# Patient Record
Sex: MALE | Race: BLACK OR AFRICAN AMERICAN | NOT HISPANIC OR LATINO | Employment: FULL TIME | URBAN - METROPOLITAN AREA
[De-identification: names, ages, dates, MRNs, and addresses within clinical notes are randomized per-mention and may not be internally consistent; named-entity substitution may affect disease eponyms.]

---

## 2023-10-12 ENCOUNTER — OFFICE VISIT (OUTPATIENT)
Dept: ORTHOPEDIC SURGERY | Facility: HOSPITAL | Age: 68
End: 2023-10-12
Payer: COMMERCIAL

## 2023-10-12 DIAGNOSIS — S46.911A STRAIN OF RIGHT UPPER ARM, INITIAL ENCOUNTER: Primary | ICD-10-CM

## 2023-10-12 DIAGNOSIS — S56.211A STRAIN OF OTHER FLEXOR MUSCLE, FASCIA AND TENDON AT FOREARM LEVEL, RIGHT ARM, INITIAL ENCOUNTER: ICD-10-CM

## 2023-10-12 PROCEDURE — 99204 OFFICE O/P NEW MOD 45 MIN: CPT | Performed by: ORTHOPAEDIC SURGERY

## 2023-10-12 RX ORDER — ACETAMINOPHEN 500 MG
500 TABLET ORAL EVERY 8 HOURS PRN
COMMUNITY
Start: 2023-01-04

## 2023-10-12 ASSESSMENT — PAIN SCALES - GENERAL: PAINLEVEL_OUTOF10: 8

## 2023-10-12 ASSESSMENT — ENCOUNTER SYMPTOMS
CHILLS: 0
SHORTNESS OF BREATH: 0
ARTHRALGIAS: 1
WHEEZING: 0
FATIGUE: 0
FEVER: 0

## 2023-10-12 ASSESSMENT — PAIN - FUNCTIONAL ASSESSMENT: PAIN_FUNCTIONAL_ASSESSMENT: 0-10

## 2023-10-12 NOTE — PROGRESS NOTES
Reason for Appointment  Chief Complaint   Patient presents with    Right Shoulder - Pain     History of Present Illness  [facility] patient is a 68 y.o. male here today for evaluation of right shoulder pain. He fell forward and tripped 2/23/23 while working as a . He landed on his right elbow and francisco his shoulder on the ground. He did not have any right elbow pain prior to the fall. 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 He had therapy after this surgery with minimal relief. Prior to his fall, he did have some right shoulder pain that has increased since the fall.     Most recent MRI 4/20/23 shows a full-thickness tear of the supraspinatus and partial thickness tearing of the infraspinatus and subscapularis; no significant atrophy of the muscle. Previous MRI 8/2019 showed a high-grade tear of the supraspinatus. These MRI show evidence that his shoulder injury from 2019 has progressed to a complete tear. He has been off work since May as the pain has increased. He complains of anterior lateral shoulder pain that is worse with over head lifting. He has some trapezius pain but no numbness. His elbow is doing fairly well and responded to therapy.     History reviewed. No pertinent past medical history.    History reviewed. No pertinent surgical history.    Medication Documentation Review Audit       Reviewed by Vi Vance MA (Medical Assistant) on 10/12/23 at 0909      Medication Order Taking? Sig Documenting Provider Last Dose Status   acetaminophen (Tylenol) 500 mg tablet [id number] Yes TAKE 1 TABLET BY MOUTH EVERY 6 HOURS FOR UP TO 5 DAYS AS NEEDED FOR PAIN Historical Provider, MD  Active                    No Known Allergies    Review of Systems   Constitutional:  Negative for chills, fatigue and fever.   Respiratory:  Negative for shortness of breath and wheezing.    Cardiovascular:  Negative for chest pain and leg swelling.    Musculoskeletal:  Positive for arthralgias.   All other systems reviewed and are negative.      Exam   On exam, there is some cervical stiffness, no significant cervical pain, there may be more scapular winging in the right than the left, good ROM of the left arm and shoulder with good rotator cuff strength and deltoid function. On exam, the right arm lacks about 30 degrees of forward flexion, good deltoid function, positive impingement sign, weakness in external rotation with pain, mild biceps tenderness, no significant joint line or AC tenderness, good elbow and wrist ROM today, good motor strength, good wrist and elbow flexion and extension strength, there are some arthritic changes at the base of the thumb and distal joints, good motion, good pulses, good sensation, no skin lesions, no hyperreflexia, good pulses, good sensation, negative Kraig's sign    Assessment   Encounter Diagnoses   Name Primary?    Strain of right upper arm, initial encounter Yes    Strain of other flexor muscle, fascia and tendon at forearm level, right arm, initial encounter        Plan   Clearly, to a reasonable degree of medical probability, he had a strain injury to the shoulder and now has a complete supraspinatus rotator cuff tear. He did have mild symptoms and a previous injury, but this recent injury was enough to complete his rotator cuff tear. He is now almost 8 months out from injury and a C9 for the supraspinatus rotator cuff tear diagnosis and a C9 for arthroscopic rotator cuff repair will be placed. Patient understands the risks, benefits, and alternatives for this procedure fully.     I, Cora Jaramillo, attest that this documentation has been prepared under the direction and in the presence of Cesar Kramer MD. By signing below, I, Cesar Kramer MD, personally performed the services described in this documentation. All medical record entries made by the scribe were at my direction and in my presence. I have reviewed  the chart and agree that the record reflects my personal performance and is accurate and complete. 10/12/23

## 2023-11-16 ENCOUNTER — OFFICE VISIT (OUTPATIENT)
Dept: ORTHOPEDIC SURGERY | Facility: HOSPITAL | Age: 68
End: 2023-11-16
Payer: COMMERCIAL

## 2023-11-16 DIAGNOSIS — S46.911A STRAIN OF RIGHT UPPER ARM, INITIAL ENCOUNTER: Primary | ICD-10-CM

## 2023-11-16 DIAGNOSIS — S56.211A STRAIN OF OTHER FLEXOR MUSCLE, FASCIA AND TENDON AT FOREARM LEVEL, RIGHT ARM, INITIAL ENCOUNTER: ICD-10-CM

## 2023-11-16 PROCEDURE — 99213 OFFICE O/P EST LOW 20 MIN: CPT | Performed by: ORTHOPAEDIC SURGERY

## 2023-11-16 ASSESSMENT — ENCOUNTER SYMPTOMS
ARTHRALGIAS: 1
FATIGUE: 0
FEVER: 0
WHEEZING: 0
SHORTNESS OF BREATH: 0
CHILLS: 0

## 2023-11-16 ASSESSMENT — PAIN - FUNCTIONAL ASSESSMENT: PAIN_FUNCTIONAL_ASSESSMENT: 0-10

## 2023-11-16 ASSESSMENT — PAIN SCALES - GENERAL: PAINLEVEL_OUTOF10: 8

## 2023-11-16 NOTE — PROGRESS NOTES
Reason for Appointment  Chief Complaint   Patient presents with    Right Shoulder - Pain, Follow-up     [facility]     History of Present Illness  Patient is a 68 y.o. male here today for follow-up evaluation of right shoulder pain. To be clear, he did have a 2019 claim for his right shoulder, but did not have surgery for this injury. Previous MRI again did document a partial thickness tear of the rotator cuff, but after that last injury he was functioning well. He was doing well prior to February when he tripped and landed on the right shoulder. Clearly, the new MRI shows a full-thickness rotator cuff tear. To a reasonable degree of probability, this fall in February caused a complete tear of the rotator cuff. It is important to note that he did not have significant symptoms prior to this new injury. No other updates to PMH, allergies, or medications.     No past medical history on file.    No past surgical history on file.    Medication Documentation Review Audit       Reviewed by Vi Vance MA (Medical Assistant) on 10/12/23 at 0909      Medication Order Taking? Sig Documenting Provider Last Dose Status   acetaminophen (Tylenol) 500 mg tablet [id number] Yes TAKE 1 TABLET BY MOUTH EVERY 6 HOURS FOR UP TO 5 DAYS AS NEEDED FOR PAIN Historical Provider, MD  Active                    No Known Allergies    Review of Systems   Constitutional:  Negative for chills, fatigue and fever.   Respiratory:  Negative for shortness of breath and wheezing.    Cardiovascular:  Negative for chest pain and leg swelling.   Musculoskeletal:  Positive for arthralgias.     Exam   On exam of the right arm, he has pain with forward elevation, mild weakness in external rotation, markedly positive impingement sign, mild biceps tenderness, no significant AC joint tenderness, good biceps function, good pulses, good sensation, mild arthritic change and instability in the right thumb    Assessment   Encounter Diagnoses   Name Primary?    Strain  of right upper arm, initial encounter Yes    Strain of other flexor muscle, fascia and tendon at forearm level, right arm, initial encounter        Plan   We could not be any more clear about the need for surgical intervention due to the full-thickness rotator cuff tear    I, Cora Jaramillo, attest that this documentation has been prepared under the direction and in the presence of Cesar Kramer MD. By signing below, I, Cesar Kramer MD, personally performed the services described in this documentation. All medical record entries made by the scribe were at my direction and in my presence. I have reviewed the chart and agree that the record reflects my personal performance and is accurate and complete. 11/16/23

## 2024-01-11 ENCOUNTER — APPOINTMENT (OUTPATIENT)
Dept: ORTHOPEDIC SURGERY | Facility: HOSPITAL | Age: 69
End: 2024-01-11
Payer: COMMERCIAL

## 2024-01-18 ENCOUNTER — OFFICE VISIT (OUTPATIENT)
Dept: ORTHOPEDIC SURGERY | Facility: HOSPITAL | Age: 69
End: 2024-01-18
Payer: COMMERCIAL

## 2024-01-18 ENCOUNTER — HOSPITAL ENCOUNTER (OUTPATIENT)
Dept: RADIOLOGY | Facility: HOSPITAL | Age: 69
Discharge: HOME | End: 2024-01-18
Payer: COMMERCIAL

## 2024-01-18 DIAGNOSIS — S56.211A STRAIN OF OTHER FLEXOR MUSCLE, FASCIA AND TENDON AT FOREARM LEVEL, RIGHT ARM, INITIAL ENCOUNTER: ICD-10-CM

## 2024-01-18 DIAGNOSIS — S46.911A STRAIN OF RIGHT UPPER ARM, INITIAL ENCOUNTER: Primary | ICD-10-CM

## 2024-01-18 DIAGNOSIS — S46.911A STRAIN OF RIGHT UPPER ARM, INITIAL ENCOUNTER: ICD-10-CM

## 2024-01-18 PROCEDURE — 73030 X-RAY EXAM OF SHOULDER: CPT | Mod: RT

## 2024-01-18 PROCEDURE — 99213 OFFICE O/P EST LOW 20 MIN: CPT | Performed by: ORTHOPAEDIC SURGERY

## 2024-01-18 ASSESSMENT — ENCOUNTER SYMPTOMS
CHILLS: 0
WHEEZING: 0
FATIGUE: 0
FEVER: 0
SHORTNESS OF BREATH: 0
ARTHRALGIAS: 1

## 2024-01-18 ASSESSMENT — PAIN SCALES - GENERAL: PAINLEVEL_OUTOF10: 7

## 2024-01-18 ASSESSMENT — PAIN - FUNCTIONAL ASSESSMENT: PAIN_FUNCTIONAL_ASSESSMENT: 0-10

## 2024-01-18 NOTE — PROGRESS NOTES
Reason for Appointment  Chief Complaint   Patient presents with    Right Shoulder - Follow-up     [facility]     History of Present Illness  [facility] patient is a 68 y.o. male here today for follow-up evaluation of his right shoulder. We are still awaiting approval for the infraspinatus tear. Today, he complains of shoulder pain that comes and goes. Pain is located over the lateral shoulder and worse with overhead lifting. Pain keeps him up at night. X-rays today show a high-riding humeral head with some mild early joint DJD. No other updates to PMH, allergies, or medications.     History reviewed. No pertinent past medical history.    History reviewed. No pertinent surgical history.    Medication Documentation Review Audit       Reviewed by Vi Vance MA (Medical Assistant) on 01/18/24 at 0902      Medication Order Taking? Sig Documenting Provider Last Dose Status   acetaminophen (Tylenol) 500 mg tablet [id number] Yes TAKE 1 TABLET BY MOUTH EVERY 6 HOURS FOR UP TO 5 DAYS AS NEEDED FOR PAIN Historical Provider, MD Taking Active                    No Known Allergies    Review of Systems   Constitutional:  Negative for chills, fatigue and fever.   Respiratory:  Negative for shortness of breath and wheezing.    Cardiovascular:  Negative for chest pain and leg swelling.   Musculoskeletal:  Positive for arthralgias.   All other systems reviewed and are negative.    Exam   On exam of the left arm, it is difficult for him to raise the arm above 100 degrees, good deltoid function, weakness in external and internal rotation, crepitation in the shoulder, tender over the joint line, good pulses, good sensation, arthritic changes in the hand    Assessment   Encounter Diagnoses   Name Primary?    Strain of right upper arm, initial encounter Yes    Strain of other flexor muscle, fascia and tendon at forearm level, right arm, initial encounter        Plan   I will order an X-ray today to document arthritic change in the shoulder. He  can continue with activity as tolerated.     I, Cora Jaramillo, attest that this documentation has been prepared under the direction and in the presence of Cesar Kramer MD. By signing below, I, Cesar Kramer MD, personally performed the services described in this documentation. All medical record entries made by the scribe were at my direction and in my presence. I have reviewed the chart and agree that the record reflects my personal performance and is accurate and complete. 01/18/24

## 2024-03-14 ENCOUNTER — APPOINTMENT (OUTPATIENT)
Dept: ORTHOPEDIC SURGERY | Facility: HOSPITAL | Age: 69
End: 2024-03-14
Payer: COMMERCIAL

## 2024-04-09 ENCOUNTER — APPOINTMENT (OUTPATIENT)
Dept: ORTHOPEDIC SURGERY | Facility: CLINIC | Age: 69
End: 2024-04-09
Payer: COMMERCIAL

## 2024-04-16 ENCOUNTER — OFFICE VISIT (OUTPATIENT)
Dept: ORTHOPEDIC SURGERY | Facility: CLINIC | Age: 69
End: 2024-04-16
Payer: COMMERCIAL

## 2024-04-16 DIAGNOSIS — S46.011A STRAIN OF TENDON OF RIGHT ROTATOR CUFF, INITIAL ENCOUNTER: Primary | ICD-10-CM

## 2024-04-16 PROCEDURE — 99213 OFFICE O/P EST LOW 20 MIN: CPT | Performed by: ORTHOPAEDIC SURGERY

## 2024-04-16 ASSESSMENT — PAIN - FUNCTIONAL ASSESSMENT: PAIN_FUNCTIONAL_ASSESSMENT: 0-10

## 2024-04-16 ASSESSMENT — PAIN SCALES - GENERAL: PAINLEVEL_OUTOF10: 7

## 2024-04-17 ASSESSMENT — ENCOUNTER SYMPTOMS
SHORTNESS OF BREATH: 0
JOINT SWELLING: 0
FATIGUE: 0
FEVER: 0
TROUBLE SWALLOWING: 0
WHEEZING: 0
CHILLS: 0
COLOR CHANGE: 0

## 2024-04-17 NOTE — PROGRESS NOTES
Reason for Appointment  Continued R shoulder pain    History of Present Illness  Patient is a 68 y.o. male here today for a [facility] case follow-up evaluation of continued right shoulder pain.  We are still waiting for approval for additional allowances to be added to his claim.  Again, we cannot be any more clear about his reinjury, did have rotator cuff tears previous work injury in 2019 but the shoulder functioned well.  Most recent injury did show progression in terms of the rotator cuff tears and he has had a clear and show aggravation of these underlying conditions.  He continues to have pain and weakness with any overhead motion and activity.  He has not been able to work due to the pain in the shoulder, he is still in therapy but is not progressing.  No other changes in his past medical history, allergies, or medications.    History reviewed. No pertinent past medical history.    History reviewed. No pertinent surgical history.    Medication Documentation Review Audit       Reviewed by Vi Vance MA (Medical Assistant) on 04/16/24 at 1345      Medication Order Taking? Sig Documenting Provider Last Dose Status   acetaminophen (Tylenol) 500 mg tablet [id number] Yes TAKE 1 TABLET BY MOUTH EVERY 6 HOURS FOR UP TO 5 DAYS AS NEEDED FOR PAIN Historical Provider, MD Taking Active                  No Known Allergies    Review of Systems   Constitutional:  Negative for chills, fatigue and fever.   HENT:  Negative for mouth sores and trouble swallowing.    Respiratory:  Negative for shortness of breath and wheezing.    Cardiovascular:  Negative for chest pain and leg swelling.   Musculoskeletal:  Negative for joint swelling.   Skin:  Negative for color change and pallor.       Exam   On exam the right shoulder shows pain with active forward flexion up to 100 degrees.  He has some mild weakness with resisted external rotation but positive impingement signs on the right.  Deltoid is functional.  Tender anterior over  the joint line.  Good pulses and station in the upper extremity.    Assessment   Right shoulder partial rotator cuff tear    Plan   We are still awaiting approval for additional allowances to be added to his claim.  Again, we cannot be any more clear about the substantial aggravation of his underlying condition.  He can continue to work in therapy on gentle stretching and maintaining motion.  We will follow-up with him in 2 months.

## 2024-05-02 ENCOUNTER — OFFICE VISIT (OUTPATIENT)
Dept: ORTHOPEDIC SURGERY | Facility: HOSPITAL | Age: 69
End: 2024-05-02
Payer: COMMERCIAL

## 2024-05-02 DIAGNOSIS — S46.011A STRAIN OF TENDON OF RIGHT ROTATOR CUFF, INITIAL ENCOUNTER: Primary | ICD-10-CM

## 2024-05-02 PROCEDURE — L3670 SO ACRO/CLAV CAN WEB PRE OTS: HCPCS | Performed by: ORTHOPAEDIC SURGERY

## 2024-05-02 PROCEDURE — 99213 OFFICE O/P EST LOW 20 MIN: CPT | Performed by: ORTHOPAEDIC SURGERY

## 2024-05-02 RX ORDER — SODIUM CHLORIDE, SODIUM LACTATE, POTASSIUM CHLORIDE, CALCIUM CHLORIDE 600; 310; 30; 20 MG/100ML; MG/100ML; MG/100ML; MG/100ML
100 INJECTION, SOLUTION INTRAVENOUS CONTINUOUS
OUTPATIENT
Start: 2024-05-02

## 2024-05-02 RX ORDER — CEFAZOLIN SODIUM 2 G/100ML
2 INJECTION, SOLUTION INTRAVENOUS ONCE
OUTPATIENT
Start: 2024-05-02 | End: 2024-05-02

## 2024-05-02 ASSESSMENT — ENCOUNTER SYMPTOMS
WHEEZING: 0
FEVER: 0
SHORTNESS OF BREATH: 0
JOINT SWELLING: 0
TROUBLE SWALLOWING: 0
COLOR CHANGE: 0
FATIGUE: 0
CHILLS: 0

## 2024-05-02 ASSESSMENT — PAIN - FUNCTIONAL ASSESSMENT: PAIN_FUNCTIONAL_ASSESSMENT: 0-10

## 2024-05-02 ASSESSMENT — PAIN SCALES - GENERAL: PAINLEVEL_OUTOF10: 6

## 2024-05-02 NOTE — PROGRESS NOTES
Reason for Appointment  Chief Complaint   Patient presents with    Right Shoulder - Follow-up     Approved surgery     History of Present Illness  Patient is a 68 y.o. male here today for a [facility] case follow-up evaluation of continued right shoulder pain.  We have received approval for additional allowances and for surgery.  He continues to have pain and weakness with any overhead activities and lifting.  He is here to discuss surgery today.  No other changes in his past medical history, allergies, or medications.     History reviewed. No pertinent past medical history.    History reviewed. No pertinent surgical history.    Medication Documentation Review Audit       Reviewed by Vi Vance MA (Medical Assistant) on 05/02/24 at 6979      Medication Order Taking? Sig Documenting Provider Last Dose Status   acetaminophen (Tylenol) 500 mg tablet [id number]  TAKE 1 TABLET BY MOUTH EVERY 6 HOURS FOR UP TO 5 DAYS AS NEEDED FOR PAIN Historical Provider, MD  Active                    No Known Allergies    Review of Systems   Constitutional:  Negative for chills, fatigue and fever.   HENT:  Negative for postnasal drip and trouble swallowing.    Respiratory:  Negative for shortness of breath and wheezing.    Cardiovascular:  Negative for chest pain and leg swelling.   Musculoskeletal:  Negative for joint swelling.   Skin:  Negative for color change and pallor.     Exam   On exam he has about 130 degrees of active forward flexion, he has weakness with resisted external rotation but a functional deltoid.  Positive impingement signs on the right. Tenderness anteriorly over the biceps tendon sheath. Good pulses and sensation in the upper extremity    Assessment   Right rotator cuff tear    Plan   We answered all of his preoperative questions today.  He understands the risks of surge including nerve, artery, tendon damage, infection, continued pain, need for future surgery, and with his longstanding rotator cuff tear, he is at  higher risk for failure long-term, even needing a possible superior capsular graft and possibly needing a reverse shoulder replacement he will call and schedule a right shoulder arthroscopic rotator cuff repair.    Written by Sandy Kramer saw, evaluated, and treated the patient with the PA

## 2024-05-06 PROBLEM — S46.011A STRAIN OF TENDON OF RIGHT ROTATOR CUFF: Status: ACTIVE | Noted: 2024-05-02

## 2024-05-07 ENCOUNTER — PREP FOR PROCEDURE (OUTPATIENT)
Dept: ORTHOPEDIC SURGERY | Facility: CLINIC | Age: 69
End: 2024-05-07
Payer: COMMERCIAL

## 2024-05-07 DIAGNOSIS — S46.011A STRAIN OF TENDON OF RIGHT ROTATOR CUFF, INITIAL ENCOUNTER: Primary | ICD-10-CM

## 2024-05-21 ENCOUNTER — PRE-ADMISSION TESTING (OUTPATIENT)
Dept: PREADMISSION TESTING | Facility: HOSPITAL | Age: 69
End: 2024-05-21
Payer: COMMERCIAL

## 2024-05-21 VITALS
BODY MASS INDEX: 27.35 KG/M2 | HEART RATE: 54 BPM | RESPIRATION RATE: 16 BRPM | HEIGHT: 68 IN | SYSTOLIC BLOOD PRESSURE: 99 MMHG | TEMPERATURE: 97.3 F | WEIGHT: 180.45 LBS | OXYGEN SATURATION: 100 % | DIASTOLIC BLOOD PRESSURE: 66 MMHG

## 2024-05-21 RX ORDER — GABAPENTIN 300 MG/1
300 CAPSULE ORAL 3 TIMES DAILY
COMMUNITY

## 2024-05-21 RX ORDER — LINACLOTIDE 145 UG/1
145 CAPSULE, GELATIN COATED ORAL
COMMUNITY
Start: 2024-03-27

## 2024-05-21 RX ORDER — LACTULOSE 10 G/15ML
20 SOLUTION ORAL DAILY PRN
COMMUNITY
Start: 2024-03-28

## 2024-05-21 RX ORDER — ALLOPURINOL 100 MG/1
200 TABLET ORAL DAILY
COMMUNITY
Start: 2024-02-15

## 2024-05-21 RX ORDER — TORSEMIDE 100 MG/1
50 TABLET ORAL DAILY
COMMUNITY
Start: 2023-10-17

## 2024-05-21 RX ORDER — HYDRALAZINE HYDROCHLORIDE 50 MG/1
50 TABLET, FILM COATED ORAL DAILY
COMMUNITY
Start: 2023-07-27

## 2024-05-21 RX ORDER — EPLERENONE 50 MG/1
50 TABLET, FILM COATED ORAL 2 TIMES DAILY
COMMUNITY
Start: 2024-03-27

## 2024-05-21 RX ORDER — FLUTICASONE FUROATE AND VILANTEROL TRIFENATATE 100; 25 UG/1; UG/1
1 POWDER RESPIRATORY (INHALATION) DAILY
COMMUNITY
Start: 2023-10-20

## 2024-05-21 RX ORDER — ATORVASTATIN CALCIUM 40 MG/1
40 TABLET, FILM COATED ORAL NIGHTLY
COMMUNITY
Start: 2024-04-11

## 2024-05-21 RX ORDER — TAMSULOSIN HYDROCHLORIDE 0.4 MG/1
0.8 CAPSULE ORAL DAILY
COMMUNITY
Start: 2024-01-26

## 2024-05-21 RX ORDER — ALBUTEROL SULFATE 90 UG/1
2 AEROSOL, METERED RESPIRATORY (INHALATION) EVERY 6 HOURS PRN
COMMUNITY
Start: 2023-10-20

## 2024-05-21 RX ORDER — SEVELAMER CARBONATE 800 MG/1
800 TABLET, FILM COATED ORAL
COMMUNITY
Start: 2024-05-02

## 2024-05-21 RX ORDER — SITAGLIPTIN 25 MG/1
25 TABLET, FILM COATED ORAL DAILY
COMMUNITY
Start: 2024-02-09

## 2024-05-21 RX ORDER — ASPIRIN 81 MG/1
81 TABLET ORAL DAILY
COMMUNITY
Start: 2023-10-20

## 2024-05-21 ASSESSMENT — DUKE ACTIVITY SCORE INDEX (DASI)
CAN YOU PARTICIPATE IN MODERATE RECREATIONAL ACTIVITIES LIKE GOLF, BOWLING, DANCING, DOUBLES TENNIS OR THROWING A BASEBALL OR FOOTBALL: YES
CAN YOU WALK A BLOCK OR TWO ON LEVEL GROUND: YES
DASI METS SCORE: 7.3
CAN YOU PARTICIPATE IN STRENOUS SPORTS LIKE SWIMMING, SINGLES TENNIS, FOOTBALL, BASKETBALL, OR SKIING: NO
CAN YOU RUN A SHORT DISTANCE: YES
CAN YOU CLIMB A FLIGHT OF STAIRS OR WALK UP A HILL: YES
CAN YOU WALK INDOORS, SUCH AS AROUND YOUR HOUSE: YES
CAN YOU DO YARD WORK LIKE RAKING LEAVES, WEEDING OR PUSHING A MOWER: YES
CAN YOU DO HEAVY WORK AROUND THE HOUSE LIKE SCRUBBING FLOORS OR LIFTING AND MOVING HEAVY FURNITURE: NO
CAN YOU DO MODERATE WORK AROUND THE HOUSE LIKE VACUUMING, SWEEPING FLOORS OR CARRYING GROCERIES: YES
TOTAL_SCORE: 37.45
CAN YOU HAVE SEXUAL RELATIONS: NO
CAN YOU TAKE CARE OF YOURSELF (EAT, DRESS, BATHE, OR USE TOILET): YES
CAN YOU DO LIGHT WORK AROUND THE HOUSE LIKE DUSTING OR WASHING DISHES: YES

## 2024-05-21 ASSESSMENT — ENCOUNTER SYMPTOMS
CONSTITUTIONAL NEGATIVE: 1
ABDOMINAL DISTENTION: 1
EYES NEGATIVE: 1
RESPIRATORY NEGATIVE: 1
CARDIOVASCULAR NEGATIVE: 1
NEUROLOGICAL NEGATIVE: 1
ENDOCRINE NEGATIVE: 1
NECK NEGATIVE: 1

## 2024-05-21 ASSESSMENT — PAIN DESCRIPTION - DESCRIPTORS: DESCRIPTORS: ACHING

## 2024-05-21 ASSESSMENT — PAIN SCALES - GENERAL: PAINLEVEL_OUTOF10: 7

## 2024-05-21 ASSESSMENT — PAIN - FUNCTIONAL ASSESSMENT: PAIN_FUNCTIONAL_ASSESSMENT: 0-10

## 2024-05-21 NOTE — CPM/PAT H&P
[facility]/PAT Evaluation       Name: Connor Mcrae (Connor Mcrae)  /Age: 1955/68 y.o.     Visit Type:   In-Person       Chief Complaint: strain of tendon of R rotator cuff    Bubba is a 69 yo male who is referred to PAT for evaluation by Dr Kramer in advance of his repair arthroscopy rotator cuff shoulder on 24. Shoulder pain 7/10.  [facility] case follow-up evaluation of continued right shoulder pain.  We are still waiting for approval for additional allowances to be added to his claim.  Again, we cannot be any more clear about his reinjury, did have rotator cuff tears previous work injury in 2019 but the shoulder functioned well.  Most recent injury did show progression in terms of the rotator cuff tears and he has had a clear and show aggravation of these underlying conditions.  He continues to have pain and weakness with any overhead motion and activity.  He has not been able to work due to the pain in the shoulder, he is still in therapy but is not progressing.     Past Medical History:   Diagnosis Date    Anemia     Arthritis     Ascites     BPH (benign prostatic hyperplasia)     CHF (congestive heart failure) (Multi)     Chronic kidney disease     Cirrhosis (Multi)     CKD (chronic kidney disease)     COPD (chronic obstructive pulmonary disease) (Multi)     Delayed emergence from general anesthesia     GERD (gastroesophageal reflux disease)     Gout     Hyperlipidemia     Hypertension     Irregular heart beat     Irritable bowel syndrome     Joint pain     Nephrolithiasis     Prostate cancer (Multi)     Shortness of breath     Sleep apnea     Type 2 diabetes mellitus (Multi)     Urinary tract infection        Past Surgical History:   Procedure Laterality Date    COLONOSCOPY      IR ABDOMEN PARACENTESIS N/A     UPPER GASTROINTESTINAL ENDOSCOPY         Patient Sexual activity questions deferred to the physician.    Family History   Problem Relation Name Age of Onset    Other (htn) Mother      Diabetic kidney  disease Mother      Other (htn) Father      Heart attack Father      Diabetes Father      Diabetes Sister      ALS Brother      Other (htn) Brother         Allergies   Allergen Reactions    Spironolactone Itching     Breast tenderness       Prior to Admission medications    Medication Sig Start Date End Date Taking? Authorizing Provider   acetaminophen (Tylenol) 500 mg tablet 1 tablet (500 mg) every 8 hours if needed. 1/4/23  Yes Historical Provider, MD   albuterol 90 mcg/actuation inhaler 2 puffs every 6 hours if needed. 10/20/23  Yes Historical Provider, MD   allopurinol (Zyloprim) 100 mg tablet Take 2 tablets (200 mg) by mouth once daily. 2/15/24  Yes Historical Provider, MD   aspirin 81 mg EC tablet Take 1 tablet (81 mg) by mouth once daily. 10/20/23  Yes Historical Provider, MD   atorvastatin (Lipitor) 40 mg tablet Take 1 tablet (40 mg) by mouth once daily at bedtime. 4/11/24  Yes Historical Provider, MD Cooper Ellipta 100-25 mcg/dose inhaler Inhale 1 puff once daily. 10/20/23  Yes Historical Provider, MD   eplerenone (Inspra) 50 mg tablet Take 1 tablet (50 mg) by mouth 2 times a day. 3/27/24  Yes Historical Provider, MD   hydrALAZINE (Apresoline) 50 mg tablet Take 1 tablet (50 mg) by mouth once daily. 7/27/23  Yes Historical Provider, MD   Januvia 25 mg tablet Take 1 tablet (25 mg) by mouth once daily. 2/9/24  Yes Historical Provider, MD   lactulose 20 gram/30 mL oral solution Take 30 mL (20 g) by mouth once daily as needed. 3/28/24  Yes Historical Provider, MD   Linzess 145 mcg capsule Take 1 capsule (145 mcg) by mouth once daily in the morning. Take before meals. 3/27/24  Yes Historical Provider, MD   sevelamer carbonate (Renvela) 800 mg tablet Take 1 tablet (800 mg) by mouth 3 times daily (morning, midday, late afternoon). 5/2/24  Yes Historical Provider, MD   tamsulosin (Flomax) 0.4 mg 24 hr capsule Take 2 capsules (0.8 mg) by mouth once daily. 1/26/24  Yes Historical Provider, MD   torsemide (Demadex) 100  mg tablet Take 0.5 tablets (50 mg) by mouth once daily. 10/17/23  Yes Historical Provider, MD   gabapentin (Neurontin) 300 mg capsule Take 1 capsule (300 mg) by mouth 3 times a day.    Historical Provider, MD WOLFE ROS:   Constitutional:   neg    Neuro/Psych:   neg    Eyes:   neg    Ears:   neg    Nose:   neg    Mouth:   neg    Throat:   neg    Neck:   neg    Cardio:   neg    Respiratory:   neg    Endocrine:   neg    GI:    abdominal distention  :    CKD  Musculoskeletal:    See hpi  Hematologic:   neg    Skin:  neg        Physical Exam  Vitals and nursing note reviewed.   Constitutional:       Appearance: Normal appearance. He is normal weight.   HENT:      Head: Normocephalic.      Nose: Nose normal.      Mouth/Throat:      Mouth: Mucous membranes are moist.      Pharynx: Oropharynx is clear.   Eyes:      Extraocular Movements: Extraocular movements intact.      Conjunctiva/sclera: Conjunctivae normal.      Pupils: Pupils are equal, round, and reactive to light.   Cardiovascular:      Rate and Rhythm: Normal rate and regular rhythm.   Pulmonary:      Breath sounds: Normal breath sounds.   Abdominal:      General: Bowel sounds are normal.      Comments: bloated   Musculoskeletal:         General: Normal range of motion.      Cervical back: Normal range of motion and neck supple.   Skin:     General: Skin is warm and dry.      Capillary Refill: Capillary refill takes 2 to 3 seconds.   Neurological:      General: No focal deficit present.      Mental Status: He is alert and oriented to person, place, and time.   Psychiatric:         Mood and Affect: Mood normal.         Behavior: Behavior normal.          PAT AIRWAY:   Airway:     Mallampati::  III    TM distance::  >3 FB    Neck ROM::  Full      Visit Vitals  BP 99/66   Pulse 54   Temp 36.3 °C (97.3 °F) (Temporal)   Resp 16       DASI Risk Score      Flowsheet Row Most Recent Value   DASI SCORE 37.45   METS Score (Will be calculated only when all the  questions are answered) 7.3          Caprini DVT Assessment      Flowsheet Row Most Recent Value   DVT Score 11   Labs/Test Results Other Thrombophilia   Current Status COPD, Major surgery planned, including arthroscopic and laproscopic (1-2 hours)   History Congestive heart failure, COPD, Acute myocardial infarction, Previous malignancy   Age 60-75 years   BMI 30 or less          Modified Frailty Index    No data to display       CHADS2 Stroke Risk  Current as of 5 hours ago        5.9% 3 to 100%: High Risk   2 to < 3%: Medium Risk   0 to < 2%: Low Risk     Last Change:           This score determines the patient's risk of having a stroke if the patient has atrial fibrillation.          Points Metrics   1 Has Congestive Heart Failure:  Yes     Patients with congestive heart failure get 1 point.    Current as of 5 hours ago   1 Has Hypertension:  Yes     Patients with hypertension get 1 point.    Current as of 5 hours ago   0 Age:  68     Patients who are 75 years of age or older get 1 point.    Current as of 5 hours ago   1 Has Diabetes:  Yes      Patients with diabetes get 1 point.    Current as of 5 hours ago   0 Had Stroke:  No  Had TIA:  No  Had Thromboembolism:  No     Patients who have had a stroke, TIA, or thromboembolism get 2 points.    Current as of 5 hours ago             Revised Cardiac Risk Index      Flowsheet Row Most Recent Value   Revised Cardiac Risk Calculator 3          Apfel Simplified Score    No data to display       Risk Analysis Index Results This Encounter    No data found in the last 1 encounters.       Stop Bang Score      Flowsheet Row Most Recent Value   Do you snore loudly? 1   Do you often feel tired or fatigued after your sleep? 1   Has anyone ever observed you stop breathing in your sleep? 1   Do you have or are you being treated for high blood pressure? 1   Recent BMI (Calculated) 0   Age older than 50 years old? 1=Yes   Is your neck circumference greater than 17 inches (Male) or  16 inches (Female)? 0   Gender - Male 1=Yes          Assessment and Plan   69 yo male with multiple co morbidities presents to [facility] today with is daughter for evaluation. He appears euvolemic, VSS stable.     PMH of TULIO, COPD, anemia, ascites, BPH, Chronic HFrEF, NYHA III, stage C, etiology is idiopathic cardiomyopathy,  CKD, GERD, HPL, HTN, IBS, nephrolithiasis, prostate cancer, diabetes, cirrhosis with no h/o alcohol or hepatitis, R and L heart failure and pulm. HTN, MGUS, renal mass, PAF,     Neuro:  No diagnosis or significant findings on chart review or clinical presentation and evaluation.    HEENT/Airway:  No diagnosis or significant findings on chart review or clinical presentation and evaluation.     Cardiovascular:  Denies dizziness, chest pain, pressure, palpitations, SOB, lightheadedness, LE swelling  HTN  BP 99/66 today   Continue Hydralazine 50 mg   HF- continue Torsemide 100mg and Inspra 50mg   See notes below    HF  Dr Costello [facility]  Connor Mcrae is a pleasant 68 year old Black male who comes to [facility] for evaluation and management of Heart Failure. The primary encounter diagnosis was Non-ischemic cardiomyopathy (HCC). Diagnoses of Acute on chronic combined systolic and diastolic heart failure (HCC), Primary hypertension, Mixed hyperlipidemia, Paroxysmal atrial fibrillation (HCC), Obstructive sleep apnea syndrome, Type 2 diabetes mellitus with other circulatory complication, without long-term current use of insulin (HCC), Gastroesophageal reflux disease without esophagitis, Other cirrhosis of liver (HCC), Malignant neoplasm of prostate (HCC), Secondary hyperparathyroidism, renal (HCC), Idiopathic chronic gout of multiple sites without tophus, Pulmonary HTN (HCC), Stage 4 chronic kidney disease (HCC), CKD (chronic kidney disease) stage 4, GFR 15-29 ml/min (HCC), and Heart disease were also pertinent to this visit.         Connor Mcrae is  well perfused today; he is currently not on optimal  Guideline-Directed Medical Therapy (GDMT) for Heart Failure. Connor Mcrae is not in need of Advanced Heart Failure Therapies at this time.      Persistent hypervolemia and hypokalemia despite spironolactone and CKD IV.    JUSTEN: 3.0% risk for postoperative MACE  EKG - 2/27/24 SR 1st degree AVB w occasional PVC, intra ventricular conduction block, , anterolateral infarct, L ant. Fascicular block    Echo 5/14/24  EF 32 +/-5%, LV diastolic function is not evaluated d/t arrhythmia, RV is dilated RVSP nl. RA/LA severely dilated, 2-3+ TR, RVSP 56 mm Hg c/w mod. Pulm htn, R atrial pressure 15 mm Hg based on IVC assessment. No PFO. C/w 3/21/23 Echo slight improvement of LVF    OUTPATIENT VISIT DATE   1/8/2024     CHIEF COMPLAINT:   CHF    NM CARDIAC STRESS/PHARM 8/19/2021  CONCLUSIONS:    1. SPECT Perfusion Study: Normal Perfusion but abnormal EF.    2. There is no scintigraphic evidence for inducible ischemia.    3. No evidence of scarred myocardium.    4. Left ventricle is moderately dilated. The left ventricle systolic   function is severely decreased.    5. Right ventricle is normal in size. The right ventricle systolic   function is normal.    6. This is a high risk scan.             Gated Stress FBP Gated Rest FBP    LVEF % 27               26      Cardiac Catheterization/Percutaneous Coronary Intervention (PCI): 6/8/2022  CORONARY ANGIOGRAPHY:  LEFT MAIN TRUNK: No Stenosis      LEFT ANTERIOR DESCENDING:  No Stenosis      DIAGONAL #1: No Stenosis      LEFT CIRCUMFLEX: No Stenosis      MARGINAL #1: No Stenosis   MARGINAL #2: No Stenosis      RIGHT CORONARY: 50% Stenosis    Location: Mid       DOMINANT: YES          POSTERIOR DESCENDING: No Stenosis, small   POSTERIOR LATERAL: No Stenosis, small      LV Hemodynamic: LVEDP 24 mmHg, no gradient across aortic valve.     NM CARDIAC MUGA 3/8/2022   CONCLUSIONS:    1. Left ventricle is mildly dilated. The left ventricle systolic   function is mildly decreased. LV ejection  fraction is 48 %. LV regional   wall motion is abnormal.    2. Right ventricle is normal in size. The right ventricle systolic   function is normal.     ARISCAT: <26 points, 1.6% risk of in-hospital postoperative pulmonary complication  PRODIGY: High risk for opioid induced respiratory depression  Discussed smoking cessation and deep breathing handout given    Renal:   Stage V CKD not on HD  Continue Renagel and Renvela as prescribed    Gout  Continue Allopurinal 100mg as prescribed      Nephrologist Dr Erick Pimentel   Saw pt on 3/28/24 and noted not a good candidate for transplant due to co morbidities.  Amyloidosis, unspecified type (HCC)   Gout with manifestations  Continue 100 mg tablets of allopurinol  He will have his uric acid level checked shortly along with his routine labs (I stopped his oral potassium because it is 4.8 and he is on eplerenone) depending on what the uric acid level is doing I like to switch him to a 300 mg tablet where he takes only a half or whole daily so we cut back on his pill burden   Anemia of chronic renal failure, stage 5 (HCC)  (HCC)  His anemia is being managed appropriately at the CKD clinic  His parathyroid hormone level is acceptable.   I hope to see him in 4 to 6 months and by then he will have it working access in his   If there is a change in his appetite or if he is having difficulty getting rid of fluid it might be time to start him on dialysis otherwise we will wait  Erick Pimentel MD     He is referred to Dr. Weaver for vein mapping and permanent access.   He is not a candidate for peritoneal dialysis d/t ascites     Pt at High risk for perioperative LINK based on Dynamic Predictive Scoring Tool for Perioperative LINK  See outside 4/24/24 labs    Pulmonary  TULIO- cpap  COPD- see EMR spirometry 3/10/23  Continue albuterol and Breo Ellipta inhaler as prescribed    Endocrine:  Diabetes  Continue Januvia 25 mg     Lab Results   Component Value Date    HGBA1C 6.5 (A)  10/06/2023     Hematologic:  Anemia of chronic renal failure stage 5- iron infusion 5/16/24  4/10/24 H/H 9.5/29.9  Seerosetta Witt CNP 24 Heme    Gastrointestinal:   Cirrhosis not related to cirrhosis or hepatitis, thought to be related to his cardiac condition.   Never had a liver bx  Ongoing chronic ascites 4.7  L paracentesis on 24  Constipation: continue Linzess 145mcg as prescribed    :  Prostate cancer- seed implantation  Continue Flomax 0.4mg daily    Infectious disease:   No diagnosis or significant findings on chart review or clinical presentation and evaluation.     Musculoskeletal:   See HPI  Continue Gabapentin 300 mg as prescribed  No NSAIDS d/t renal disease  No Tylenol due to cirrhosis    Preoperative risk assessment  ASA IV  LYNN -has TULIO   NSQIP - Predicted length of stay days 0-3  PAT Testing - labs from [facility] 24 reviewed     PENDING LABS/EKG-LABS REVIEWED, STABLE. OK TO PROCEED    Anesthesia:  Has delayed emergence    denies dental issues  Smoker-EX  Drugs- denies  ETOH- denies  denies personal/family issues with Anesthesia  No nickel, shell fish, or iodine allergies    Secure messagin24 I contacted Dr Feng and Dr Kramer that patient is not a candidate for [facility]. I have completed his work up except labs and medication instructions.     Patient is at acceptable risk to proceed with planned surgical procedure. Further cardiac risk stratification deferred at this time.This patient is low risk candidate undergoing moderate risk procedure, patient is medically optimized for surgery    Discussed with patient medication instructions, NPO guidelines, and any questions or concerns. Patient does not need further workup prior to preceding with elective surgery based on based on risk assessment. .     Face to Face patient contact time 45 min    MARISELA Gtz 2024 4:47 PM

## 2024-06-12 ENCOUNTER — PREP FOR PROCEDURE (OUTPATIENT)
Dept: ORTHOPEDIC SURGERY | Facility: CLINIC | Age: 69
End: 2024-06-12
Payer: COMMERCIAL

## 2024-06-20 ENCOUNTER — APPOINTMENT (OUTPATIENT)
Dept: ORTHOPEDIC SURGERY | Facility: HOSPITAL | Age: 69
End: 2024-06-20
Payer: COMMERCIAL

## 2024-08-08 ENCOUNTER — OFFICE VISIT (OUTPATIENT)
Dept: ORTHOPEDIC SURGERY | Facility: HOSPITAL | Age: 69
End: 2024-08-08
Payer: COMMERCIAL

## 2024-08-08 DIAGNOSIS — S46.011A STRAIN OF TENDON OF RIGHT ROTATOR CUFF, INITIAL ENCOUNTER: Primary | ICD-10-CM

## 2024-08-08 PROCEDURE — 99213 OFFICE O/P EST LOW 20 MIN: CPT | Performed by: ORTHOPAEDIC SURGERY

## 2024-08-08 ASSESSMENT — ENCOUNTER SYMPTOMS
TROUBLE SWALLOWING: 0
SHORTNESS OF BREATH: 0
CHILLS: 0
ARTHRALGIAS: 1
WHEEZING: 0
FATIGUE: 0
FEVER: 0
SINUS PRESSURE: 0

## 2024-08-08 ASSESSMENT — PAIN - FUNCTIONAL ASSESSMENT: PAIN_FUNCTIONAL_ASSESSMENT: 0-10

## 2024-08-08 ASSESSMENT — PAIN SCALES - GENERAL: PAINLEVEL_OUTOF10: 10 - WORST POSSIBLE PAIN

## 2024-08-08 NOTE — PROGRESS NOTES
Reason for Appointment  Chief Complaint   Patient presents with    Right Shoulder - Follow-up     History of Present Illness  Patient is a 68 y.o. male here today for a [facility] case follow-up evaluation of continued right shoulder pain.  He did have approval previously for right shoulder arthroscopic rotator cuff repair but he has a significant medical history of congestive heart failure and ascites.  He is not a good surgical candidate at this point.  He has not had injections in the past.  He continues to have pain with lifting and sleeping at night.  No other changes in his past medical history, allergies, or medications.     Past Medical History:   Diagnosis Date    Anemia     Arthritis     Ascites     BPH (benign prostatic hyperplasia)     CHF (congestive heart failure) (Multi)     Chronic kidney disease     Cirrhosis (Multi)     CKD (chronic kidney disease)     COPD (chronic obstructive pulmonary disease) (Multi)     Delayed emergence from general anesthesia     GERD (gastroesophageal reflux disease)     Gout     Hyperlipidemia     Hypertension     Irregular heart beat     Irritable bowel syndrome     Joint pain     Nephrolithiasis     Prostate cancer (Multi)     Shortness of breath     Sleep apnea     Type 2 diabetes mellitus (Multi)     Urinary tract infection        Past Surgical History:   Procedure Laterality Date    COLONOSCOPY      IR ABDOMEN PARACENTESIS N/A     UPPER GASTROINTESTINAL ENDOSCOPY         Medication Documentation Review Audit       Reviewed by Sandy Das PA-C (Physician Assistant) on 08/08/24 at 0953      Medication Order Taking? Sig Documenting Provider Last Dose Status   acetaminophen (Tylenol) 500 mg tablet [id number] Yes 1 tablet (500 mg) every 8 hours if needed. Historical Provider, MD Taking Active   albuterol 90 mcg/actuation inhaler [id number] Yes 2 puffs every 6 hours if needed. Historical Provider, MD Taking Active   allopurinol (Zyloprim) 100 mg tablet [id number] Yes Take 2  tablets (200 mg) by mouth once daily. Historical MD Perez Taking Active   aspirin 81 mg EC tablet [id number] Yes Take 1 tablet (81 mg) by mouth once daily. Historical MD Perez Taking Active   atorvastatin (Lipitor) 40 mg tablet [id number] Yes Take 1 tablet (40 mg) by mouth once daily at bedtime. Historical MD Perez Taking Active   Breo Ellipta 100-25 mcg/dose inhaler [id number] Yes Inhale 1 puff once daily. Historical MD Perez Taking Active   eplerenone (Inspra) 50 mg tablet [id number] Yes Take 1 tablet (50 mg) by mouth 2 times a day. Historical MD Perez Taking Active   gabapentin (Neurontin) 300 mg capsule [id number] Yes Take 1 capsule (300 mg) by mouth 3 times a day. Historical MD Perez Taking Active   hydrALAZINE (Apresoline) 50 mg tablet [id number] Yes Take 1 tablet (50 mg) by mouth once daily. Historical MD Perez Taking Active   Januvia 25 mg tablet [id number] Yes Take 1 tablet (25 mg) by mouth once daily. Historical MD Perez Taking Active   lactulose 20 gram/30 mL oral solution [id number] Yes Take 30 mL (20 g) by mouth once daily as needed. Historical MD Perez Taking Active   Linzess 145 mcg capsule [id number] Yes Take 1 capsule (145 mcg) by mouth once daily in the morning. Take before meals. Historical MD Perez Taking Active   sevelamer carbonate (Renvela) 800 mg tablet [id number] Yes Take 1 tablet (800 mg) by mouth 3 times daily (morning, midday, late afternoon). Historical MD Perez Taking Active   tamsulosin (Flomax) 0.4 mg 24 hr capsule [id number] Yes Take 2 capsules (0.8 mg) by mouth once daily. Historical MD Perez Taking Active   torsemide (Demadex) 100 mg tablet [id number] Yes Take 0.5 tablets (50 mg) by mouth once daily. Historical MD Perez Taking Active                    Allergies   Allergen Reactions    Spironolactone Itching     Breast tenderness       Review of Systems   Constitutional:  Negative for chills, fatigue and fever.   HENT:  Negative for  nosebleeds, sinus pressure and trouble swallowing.    Respiratory:  Negative for shortness of breath and wheezing.    Cardiovascular:  Negative for chest pain and leg swelling.   Musculoskeletal:  Positive for arthralgias.   Skin:  Negative for pallor and rash.     Exam   On exam the right shoulder shows good active forward flexion over 140 degrees.  He has some mild weakness with resisted external rotation but fairly good cuff strength.  Markedly positive impingement signs today.  Deltoid is functional.  Good pulses and sensation in the upper extremity.    Assessment   Right shoulder rotator cuff tear    Plan   We did have a long discussion with him today, at this point he is not a good surgical candidate to proceed with any arthroscopic surgery.  With his large chronic rotator cuff tear, he could be a candidate for a reverse shoulder replacement long-term if his health deems this possible.  We discussed a simple cortisone injection today to see if this improves his pain and function.  We will place a C9 for right shoulder subacromial injection.

## 2024-08-29 ENCOUNTER — APPOINTMENT (OUTPATIENT)
Dept: ORTHOPEDIC SURGERY | Facility: HOSPITAL | Age: 69
End: 2024-08-29
Payer: COMMERCIAL

## 2024-09-03 ENCOUNTER — OFFICE VISIT (OUTPATIENT)
Dept: ORTHOPEDIC SURGERY | Facility: CLINIC | Age: 69
End: 2024-09-03
Payer: COMMERCIAL

## 2024-09-03 DIAGNOSIS — S46.011A STRAIN OF TENDON OF RIGHT ROTATOR CUFF, INITIAL ENCOUNTER: Primary | ICD-10-CM

## 2024-09-03 PROCEDURE — 2500000005 HC RX 250 GENERAL PHARMACY W/O HCPCS: Performed by: ORTHOPAEDIC SURGERY

## 2024-09-03 PROCEDURE — 2500000004 HC RX 250 GENERAL PHARMACY W/ HCPCS (ALT 636 FOR OP/ED): Performed by: ORTHOPAEDIC SURGERY

## 2024-09-03 PROCEDURE — 20611 DRAIN/INJ JOINT/BURSA W/US: CPT | Mod: RT | Performed by: ORTHOPAEDIC SURGERY

## 2024-09-03 RX ORDER — LIDOCAINE HYDROCHLORIDE 10 MG/ML
3 INJECTION INFILTRATION; PERINEURAL
Status: COMPLETED | OUTPATIENT
Start: 2024-09-03 | End: 2024-09-03

## 2024-09-03 ASSESSMENT — PAIN SCALES - GENERAL: PAINLEVEL_OUTOF10: 7

## 2024-09-03 ASSESSMENT — PAIN - FUNCTIONAL ASSESSMENT: PAIN_FUNCTIONAL_ASSESSMENT: 0-10

## 2024-09-03 NOTE — PROGRESS NOTES
Reason for Appointment  Chief Complaint   Patient presents with    Right Shoulder - Follow-up     History of Present Illness  Patient is here today for a [facility] approved right shoulder injection.  We sterilely injected under ultrasound guidance Kenalog and lidocaine into the right shoulder subacromial space.  Patient understands the small risk of infection and the signs to look for as well as flare action.  Hopefully this gives him good relief.  We will follow-up with him in 2 months and reassess him at that point.    Assessment   Right rotator cuff tear    L Inj/Asp: R subacromial bursa on 9/3/2024 11:30 AM  Indications: pain  Details: 22 G needle, ultrasound-guided  Medications: 3 mL lidocaine 10 mg/mL (1 %); 30 mg triamcinolone acetonide 10 mg/mL  Outcome: tolerated well, no immediate complications    After discussing the risks and benefits of the procedure with proceeded with an injection.  Using ultrasound guidance we identified the acromion, humeral head and the subacromial bursa, images obtained. We then sterilely injected the right subacrominal space with a mixture of 30 mg of Kenalog and 2 cc of 1 % lidocaine. Pt tolerated the procedure well without any adverse reactions.   Procedure, treatment alternatives, risks and benefits explained, specific risks discussed. Consent was given by the patient. Immediately prior to procedure a time out was called to verify the correct patient, procedure, equipment, support staff and site/side marked as required. Patient was prepped and draped in the usual sterile fashion.

## 2024-11-07 ENCOUNTER — APPOINTMENT (OUTPATIENT)
Dept: ORTHOPEDIC SURGERY | Facility: HOSPITAL | Age: 69
End: 2024-11-07
Payer: COMMERCIAL

## 2024-12-16 ENCOUNTER — LAB REQUISITION (OUTPATIENT)
Dept: LAB | Facility: HOSPITAL | Age: 69
End: 2024-12-16
Payer: COMMERCIAL

## 2024-12-16 LAB — POTASSIUM SERPL-SCNC: 4.5 MMOL/L (ref 3.5–5.3)

## 2024-12-16 PROCEDURE — 84132 ASSAY OF SERUM POTASSIUM: CPT

## 2025-06-12 ENCOUNTER — HOSPITAL ENCOUNTER (OUTPATIENT)
Dept: VASCULAR MEDICINE | Facility: HOSPITAL | Age: 70
Discharge: HOME | End: 2025-06-12
Payer: MEDICARE

## 2025-06-12 DIAGNOSIS — R60.0 LOCALIZED EDEMA: ICD-10-CM

## 2025-06-12 DIAGNOSIS — M25.561 PAIN IN RIGHT KNEE: ICD-10-CM

## 2025-06-12 PROCEDURE — 93970 EXTREMITY STUDY: CPT | Performed by: INTERNAL MEDICINE

## 2025-06-12 PROCEDURE — 93970 EXTREMITY STUDY: CPT
